# Patient Record
Sex: FEMALE | Race: WHITE | URBAN - METROPOLITAN AREA
[De-identification: names, ages, dates, MRNs, and addresses within clinical notes are randomized per-mention and may not be internally consistent; named-entity substitution may affect disease eponyms.]

---

## 2020-02-24 ENCOUNTER — OFFICE VISIT (OUTPATIENT)
Dept: INTERNAL MEDICINE CLINIC | Age: 40
End: 2020-02-24
Payer: MEDICARE

## 2020-02-24 VITALS
WEIGHT: 133.8 LBS | BODY MASS INDEX: 26.27 KG/M2 | SYSTOLIC BLOOD PRESSURE: 92 MMHG | HEART RATE: 66 BPM | RESPIRATION RATE: 18 BRPM | OXYGEN SATURATION: 99 % | DIASTOLIC BLOOD PRESSURE: 56 MMHG | HEIGHT: 60 IN

## 2020-02-24 PROCEDURE — G8427 DOCREV CUR MEDS BY ELIG CLIN: HCPCS | Performed by: NURSE PRACTITIONER

## 2020-02-24 PROCEDURE — 99202 OFFICE O/P NEW SF 15 MIN: CPT | Performed by: NURSE PRACTITIONER

## 2020-02-24 PROCEDURE — 96160 PT-FOCUSED HLTH RISK ASSMT: CPT | Performed by: NURSE PRACTITIONER

## 2020-02-24 PROCEDURE — G8419 CALC BMI OUT NRM PARAM NOF/U: HCPCS | Performed by: NURSE PRACTITIONER

## 2020-02-24 PROCEDURE — 1036F TOBACCO NON-USER: CPT | Performed by: NURSE PRACTITIONER

## 2020-02-24 PROCEDURE — G8484 FLU IMMUNIZE NO ADMIN: HCPCS | Performed by: NURSE PRACTITIONER

## 2020-02-24 RX ORDER — NALOXONE HYDROCHLORIDE 4 MG/.1ML
SPRAY NASAL
COMMUNITY
Start: 2019-12-18

## 2020-02-24 RX ORDER — HYDROXYZINE PAMOATE 50 MG/1
50 CAPSULE ORAL 3 TIMES DAILY PRN
COMMUNITY

## 2020-02-24 RX ORDER — BUPRENORPHINE HYDROCHLORIDE AND NALOXONE HYDROCHLORIDE DIHYDRATE 8; 2 MG/1; MG/1
3 TABLET SUBLINGUAL DAILY
COMMUNITY

## 2020-02-24 RX ORDER — PAROXETINE HYDROCHLORIDE 20 MG/1
20 TABLET, FILM COATED ORAL DAILY
Qty: 30 TABLET | Refills: 3 | Status: SHIPPED | OUTPATIENT
Start: 2020-02-24 | End: 2020-08-18 | Stop reason: SDUPTHER

## 2020-02-24 SDOH — ECONOMIC STABILITY: TRANSPORTATION INSECURITY
IN THE PAST 12 MONTHS, HAS THE LACK OF TRANSPORTATION KEPT YOU FROM MEDICAL APPOINTMENTS OR FROM GETTING MEDICATIONS?: YES

## 2020-02-24 SDOH — SOCIAL STABILITY: SOCIAL INSECURITY
WITHIN THE LAST YEAR, HAVE TO BEEN RAPED OR FORCED TO HAVE ANY KIND OF SEXUAL ACTIVITY BY YOUR PARTNER OR EX-PARTNER?: NO

## 2020-02-24 SDOH — SOCIAL STABILITY: SOCIAL INSECURITY: WITHIN THE LAST YEAR, HAVE YOU BEEN HUMILIATED OR EMOTIONALLY ABUSED IN OTHER WAYS BY YOUR PARTNER OR EX-PARTNER?: NO

## 2020-02-24 SDOH — ECONOMIC STABILITY: FOOD INSECURITY: WITHIN THE PAST 12 MONTHS, YOU WORRIED THAT YOUR FOOD WOULD RUN OUT BEFORE YOU GOT MONEY TO BUY MORE.: OFTEN TRUE

## 2020-02-24 SDOH — SOCIAL STABILITY: SOCIAL INSECURITY: WITHIN THE LAST YEAR, HAVE YOU BEEN AFRAID OF YOUR PARTNER OR EX-PARTNER?: YES

## 2020-02-24 SDOH — SOCIAL STABILITY: SOCIAL INSECURITY
WITHIN THE LAST YEAR, HAVE YOU BEEN KICKED, HIT, SLAPPED, OR OTHERWISE PHYSICALLY HURT BY YOUR PARTNER OR EX-PARTNER?: NO

## 2020-02-24 SDOH — ECONOMIC STABILITY: TRANSPORTATION INSECURITY
IN THE PAST 12 MONTHS, HAS LACK OF TRANSPORTATION KEPT YOU FROM MEETINGS, WORK, OR FROM GETTING THINGS NEEDED FOR DAILY LIVING?: YES

## 2020-02-24 SDOH — ECONOMIC STABILITY: FOOD INSECURITY: WITHIN THE PAST 12 MONTHS, THE FOOD YOU BOUGHT JUST DIDN'T LAST AND YOU DIDN'T HAVE MONEY TO GET MORE.: OFTEN TRUE

## 2020-02-24 SDOH — ECONOMIC STABILITY: INCOME INSECURITY: HOW HARD IS IT FOR YOU TO PAY FOR THE VERY BASICS LIKE FOOD, HOUSING, MEDICAL CARE, AND HEATING?: VERY HARD

## 2020-02-24 ASSESSMENT — PATIENT HEALTH QUESTIONNAIRE - PHQ9
7. TROUBLE CONCENTRATING ON THINGS, SUCH AS READING THE NEWSPAPER OR WATCHING TELEVISION: 3
SUM OF ALL RESPONSES TO PHQ QUESTIONS 1-9: 19
9. THOUGHTS THAT YOU WOULD BE BETTER OFF DEAD, OR OF HURTING YOURSELF: 0
8. MOVING OR SPEAKING SO SLOWLY THAT OTHER PEOPLE COULD HAVE NOTICED. OR THE OPPOSITE, BEING SO FIGETY OR RESTLESS THAT YOU HAVE BEEN MOVING AROUND A LOT MORE THAN USUAL: 3
SUM OF ALL RESPONSES TO PHQ9 QUESTIONS 1 & 2: 5
4. FEELING TIRED OR HAVING LITTLE ENERGY: 2
1. LITTLE INTEREST OR PLEASURE IN DOING THINGS: 2
10. IF YOU CHECKED OFF ANY PROBLEMS, HOW DIFFICULT HAVE THESE PROBLEMS MADE IT FOR YOU TO DO YOUR WORK, TAKE CARE OF THINGS AT HOME, OR GET ALONG WITH OTHER PEOPLE: 2
SUM OF ALL RESPONSES TO PHQ QUESTIONS 1-9: 19
3. TROUBLE FALLING OR STAYING ASLEEP: 3
2. FEELING DOWN, DEPRESSED OR HOPELESS: 3
6. FEELING BAD ABOUT YOURSELF - OR THAT YOU ARE A FAILURE OR HAVE LET YOURSELF OR YOUR FAMILY DOWN: 3
5. POOR APPETITE OR OVEREATING: 0

## 2020-02-24 ASSESSMENT — ENCOUNTER SYMPTOMS
SHORTNESS OF BREATH: 0
ABDOMINAL PAIN: 0
DIARRHEA: 0
CHEST TIGHTNESS: 0
CONSTIPATION: 0
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
## 2020-02-24 NOTE — LETTER
[address] [place] [address]  Phone: [phone]  Fax: [phone] Taylor, APRN - CNP                                                                                   February 24, 2020                       Valeriano Rodriguez  [address]   [address] [facility] [place] [address]      Dear Maile Paez: Thank you for enrolling in [address] Please follow the instructions below to securely access your online medical record. [email or web address] allows you to send messages to your doctor, view your test results, renew your prescriptions, schedule appointments, and more. How Do I Sign Up? 1. In your Internet browser, go to [email or web address]  2. Click on the Sign Up Now link in the Sign In box. You will see the New Member Sign Up page. 3. Enter your [email or web address] Access Code exactly as it appears below. You will not need to use this code after youve completed the sign-up process. If you do not sign up before the expiration date, you must request a new code. [email or web address] Access Code: [email or web address]  Expires: 4/9/2020 10:54 AM    4. Enter your Social Security Number ([id number]) and Date of Birth (mm/dd/yyyy) as indicated and click Submit. You will be taken to the next sign-up page. 5. Create a [email or web address] ID. This will be your [email or web address] login ID and cannot be changed, so think of one that is secure and easy to remember. 6. Create a [email or web address] password. You can change your password at any time. 7. Enter your Password Reset Question and Answer. This can be used at a later time if you forget your password. 8. Enter your e-mail address. You will receive e-mail notification when new information is available in [address] 9. Click Sign Up. You can now view your medical record. Additional Information  If you have questions, please contact the physician practice where you receive care. Remember, [email or web address] is NOT to be used for urgent needs. For medical emergencies, dial 911. For questions regarding your [employer] account call [phone]. If you have a clinical question, please call your doctor's office.     Sincerely,  ROMAN Martinez CNP

## 2020-02-24 NOTE — PATIENT INSTRUCTIONS
Start taking Paxil daily  Patient Education        paroxetine  Pronunciation:  nick SPANGLER a teen  Brand:  Brisdelle, Paxil, Paxil CR, Monica Wood  What is the most important information I should know about paroxetine? You should not use paroxetine if you are also taking pimozide or thioridazine. Do not use paroxetine within 14 days before or 14 days after you have used an MAO inhibitor, such as isocarboxazid, linezolid, methylene blue injection, phenelzine, rasagiline, selegiline, or tranylcypromine. Some young people have thoughts about suicide when first taking an antidepressant. Stay alert to changes in your mood or symptoms. Report any new or worsening symptoms to your doctor  Seek medical attention right away if you have symptoms such as: agitation, hallucinations, muscle stiffness, twitching, loss of coordination, dizziness, warmth or tingly feeling, nausea, vomiting, diarrhea, fever, sweating, tremors, racing heartbeats, or a seizure (convulsions). What is paroxetine? Paroxetine is an antidepressant in a group of drugs called selective serotonin reuptake inhibitors (SSRIs). Paroxetine affects chemicals in the brain that may be unbalanced in people with depression, anxiety, or other disorders. Paroxetine is used to treat depression, obsessive-compulsive disorder, anxiety disorders, post-traumatic stress disorder (PTSD), and premenstrual dysphoric disorder (PMDD). The Brisdelle brand of paroxetine is used to treat hot flashes related to menopause. Shama Lev is not for treating any other conditions. Paroxetine may also be used for purposes not listed in this medication guide. What should I discuss with my healthcare provider before taking paroxetine? You should not use this medicine if you are allergic to paroxetine, or if you are also taking pimozide or thioridazine. Do not use an MAO inhibitor within 14 days before or 14 days after you take paroxetine. A dangerous drug interaction could occur.  MAO inhibitors include isocarboxazid, linezolid, phenelzine, rasagiline, selegiline, and tranylcypromine. After you stop taking paroxetine you must wait at least 14 days before you start taking an MAO inhibitor. To make sure paroxetine is safe for you, tell your doctor if you have:  · heart disease, high blood pressure, history of stroke;  · liver or kidney disease;  · a bleeding or blood clotting disorder;  · seizures or epilepsy;  · bipolar disorder (manic depression), or a history of drug abuse or suicidal thoughts;  · narrow-angle glaucoma; or  · low levels of sodium in your blood. Some young people have thoughts about suicide when first taking an antidepressant. Your doctor should check your progress at regular visits. Your family or other caregivers should also be alert to changes in your mood or symptoms. Taking an paroxetine during pregnancy may cause serious lung problems, a heart defect, or other complications in the baby. However, you may have a relapse of depression or other treated condition if you stop taking your antidepressant. Tell your doctor right away if you become pregnant. Do not start or stop taking this medicine during pregnancy without your doctor's advice. Do not use Brisdelle if you are pregnant. Paroxetine can pass into breast milk and may cause side effects in the nursing baby. You should not breast-feed while using this medicine. Paroxetine is not approved for use by anyone younger than 25years old. How should I take paroxetine? Follow all directions on your prescription label. Your doctor may occasionally change your dose. Do not take this medicine in larger or smaller amounts or for longer than recommended. Do not crush, chew, or break an extended-release tablet. Swallow it whole. Shake the oral suspension (liquid) well just before you measure a dose. Measure liquid medicine with the dosing syringe provided, or with a special dose-measuring spoon or medicine cup.  If you do not have a dose-measuring device, ask your pharmacist for one. It may take up to 4 weeks before your symptoms improve. Keep using the medication as directed and tell your doctor if your symptoms do not improve. Do not stop using paroxetine suddenly, or you could have unpleasant withdrawal symptoms. Ask your doctor how to safely stop using paroxetine. Follow your doctor's instructions about tapering your dose. Store at room temperature away from moisture, heat, and light. What happens if I miss a dose? Take the missed dose as soon as you remember. Skip the missed dose if it is almost time for your next scheduled dose. Do not take extra medicine to make up the missed dose. What happens if I overdose? Seek emergency medical attention or call the Poison Help line at 1-723.552.8904. An overdose of paroxetine can be fatal.  What should I avoid while taking paroxetine? Drinking alcohol with this medicine can cause side effects. Ask your doctor before taking a nonsteroidal anti-inflammatory drug (NSAID) for pain, arthritis, fever, or swelling. This includes aspirin, ibuprofen (Advil, Motrin), naproxen (Aleve), celecoxib (Celebrex), diclofenac, indomethacin, meloxicam, and others. Using an NSAID with paroxetine may cause you to bruise or bleed easily. Paroxetine may impair your thinking or reactions. Be careful if you drive or do anything that requires you to be alert. What are the possible side effects of paroxetine? Get emergency medical help if you have signs of an allergic reaction: skin rash or hives; difficult breathing; swelling of your face, lips, tongue, or throat. Report any new or worsening symptoms to your doctor, such as: mood or behavior changes, anxiety, panic attacks, trouble sleeping, or if you feel impulsive, irritable, agitated, hostile, aggressive, restless, hyperactive (mentally or physically), more depressed, or have thoughts about suicide or hurting yourself.   Call your doctor at once if you have:  · racing thoughts, decreased need for sleep, unusual risk-taking behavior, feelings of extreme happiness or sadness, being more talkative than usual;  · blurred vision, tunnel vision, eye pain or swelling, or seeing halos around lights;  · unusual bone pain or tenderness, swelling or bruising;  · changes in weight or appetite;  · easy bruising, unusual bleeding (nose, mouth, vagina, or rectum), coughing up blood;  · high levels of serotonin in the body --agitation, hallucinations, fever, fast heart rate, overactive reflexes, nausea, vomiting, diarrhea, loss of coordination, fainting;  · low levels of sodium in the body --headache, confusion, slurred speech, severe weakness, loss of coordination, feeling unsteady;  · severe nervous system reaction --very stiff (rigid) muscles, high fever, sweating, confusion, fast or uneven heartbeats, tremors, fainting; or  · severe skin reaction --fever, sore throat, swelling in your face or tongue, burning in your eyes, skin pain, followed by a red or purple skin rash that spreads (especially in the face or upper body) and causes blistering and peeling. Common side effects may include:  · vision changes;  · weakness, drowsiness, dizziness;  · sweating, anxiety, shaking;  · sleep problems (insomnia);  · loss of appetite, constipation;  · dry mouth, yawning; or  · decreased sex drive, impotence, or difficulty having an orgasm. This is not a complete list of side effects and others may occur. Call your doctor for medical advice about side effects. You may report side effects to FDA at 1-060-FDA-2794. What other drugs will affect paroxetine? Taking paroxetine with other drugs that make you sleepy can worsen this effect. Ask your doctor before taking a sleeping pill, narcotic medication, muscle relaxer, or medicine for anxiety, depression, or seizures.   Tell your doctor about all your current medicines and any you start or stop using, especially:  · cimetidine (Tagamet), St. Cain's wort, tamoxifen, tryptophan (sometimes called L-tryptophan), warfarin (Coumadin, Jantoven);  · heart rhythm medicine;  · HIV or AIDS medications;  · certain medicines to treat narcolepsy or ADHD --amphetamine, atomoxetine, dextroamphetamine, Adderall, Dexedrine, Evekeo, Vyvanse, and others;  · narcotic pain medicine --fentanyl, tramadol;  · medicine to treat anxiety, mood disorders, thought disorders, or mental illness --such as buspirone, lithium, other antidepressants, or antipsychotics;  · migraine headache medicine --sumatriptan, rizatriptan, zolmitriptan, and others; or  · seizure medicine --phenobarbital, phenytoin. This list is not complete. Other drugs may interact with paroxetine, including prescription and over-the-counter medicines, vitamins, and herbal products. Not all possible interactions are listed in this medication guide. Where can I get more information? Your pharmacist can provide more information about paroxetine. Remember, keep this and all other medicines out of the reach of children, never share your medicines with others, and use this medication only for the indication prescribed. Every effort has been made to ensure that the information provided by [facility] is accurate, up-to-date, and complete, but no guarantee is made to that effect. Drug information contained herein may be time sensitive. [facility] information has been compiled for use by healthcare practitioners and consumers in the [facility] and therefore [facility] does not warrant that uses outside of the [facility] are appropriate, unless specifically indicated otherwise. [facility]'s drug information does not endorse drugs, diagnose patients or recommend therapy.  [facility] drug information is an informational resource designed to assist licensed healthcare practitioners in caring for their patients and/or to serve consumers viewing this service as a supplement to, and not a substitute for, the expertise, skill, knowledge and judgment of healthcare practitioners. The absence of a warning for a given drug or drug combination in no way should be construed to indicate that the drug or drug combination is safe, effective or appropriate for any given patient. [facility] does not assume any responsibility for any aspect of healthcare administered with the aid of information [facility] provides. The information contained herein is not intended to cover all possible uses, directions, precautions, warnings, drug interactions, allergic reactions, or adverse effects. If you have questions about the drugs you are taking, check with your doctor, nurse or pharmacist.  Copyright 6170-0750 [address]: 26.01. Revision date: 5/8/2017. Care instructions adapted under license by [facility] ([facility]). If you have questions about a medical condition or this instruction, always ask your healthcare professional. [place] [address] any warranty or liability for your use of this information.      Increase fluid intake  Make appointment with [facility] and Psychologist

## 2020-03-06 PROBLEM — G43.909 MIGRAINES: Status: ACTIVE | Noted: 2020-03-06

## 2020-03-06 PROBLEM — F11.10 OPIATE ABUSE, CONTINUOUS (HCC): Status: ACTIVE | Noted: 2020-03-06

## 2020-03-19 ENCOUNTER — OFFICE VISIT (OUTPATIENT)
Dept: PSYCHOLOGY | Age: 40
End: 2020-03-19
Payer: MEDICARE

## 2020-03-19 ENCOUNTER — TELEPHONE (OUTPATIENT)
Dept: INTERNAL MEDICINE CLINIC | Age: 40
End: 2020-03-19

## 2020-03-19 ENCOUNTER — OFFICE VISIT (OUTPATIENT)
Dept: INTERNAL MEDICINE CLINIC | Age: 40
End: 2020-03-19
Payer: MEDICARE

## 2020-03-19 VITALS
TEMPERATURE: 98.2 F | HEART RATE: 72 BPM | WEIGHT: 136.6 LBS | BODY MASS INDEX: 27.54 KG/M2 | SYSTOLIC BLOOD PRESSURE: 103 MMHG | HEIGHT: 59 IN | DIASTOLIC BLOOD PRESSURE: 65 MMHG | RESPIRATION RATE: 14 BRPM | OXYGEN SATURATION: 97 %

## 2020-03-19 PROBLEM — F43.22 ANXIOUS MOOD AS ADJUSTMENT REACTION: Status: ACTIVE | Noted: 2020-03-19

## 2020-03-19 PROBLEM — F34.1 PERSISTENT DEPRESSIVE DISORDER WITH ANXIOUS DISTRESS, CURRENTLY MODERATE: Status: ACTIVE | Noted: 2020-03-19

## 2020-03-19 PROCEDURE — G8419 CALC BMI OUT NRM PARAM NOF/U: HCPCS | Performed by: NURSE PRACTITIONER

## 2020-03-19 PROCEDURE — G8484 FLU IMMUNIZE NO ADMIN: HCPCS | Performed by: NURSE PRACTITIONER

## 2020-03-19 PROCEDURE — G8427 DOCREV CUR MEDS BY ELIG CLIN: HCPCS | Performed by: NURSE PRACTITIONER

## 2020-03-19 PROCEDURE — 1036F TOBACCO NON-USER: CPT | Performed by: NURSE PRACTITIONER

## 2020-03-19 PROCEDURE — 96160 PT-FOCUSED HLTH RISK ASSMT: CPT | Performed by: NURSE PRACTITIONER

## 2020-03-19 PROCEDURE — 90791 PSYCH DIAGNOSTIC EVALUATION: CPT | Performed by: PSYCHOLOGIST

## 2020-03-19 PROCEDURE — G8431 POS CLIN DEPRES SCRN F/U DOC: HCPCS | Performed by: NURSE PRACTITIONER

## 2020-03-19 PROCEDURE — 99213 OFFICE O/P EST LOW 20 MIN: CPT | Performed by: NURSE PRACTITIONER

## 2020-03-19 RX ORDER — ONDANSETRON 4 MG/1
4 TABLET, FILM COATED ORAL 3 TIMES DAILY PRN
Qty: 30 TABLET | Refills: 0 | Status: SHIPPED | OUTPATIENT
Start: 2020-03-19

## 2020-03-19 ASSESSMENT — PATIENT HEALTH QUESTIONNAIRE - PHQ9
SUM OF ALL RESPONSES TO PHQ9 QUESTIONS 1 & 2: 4
6. FEELING BAD ABOUT YOURSELF - OR THAT YOU ARE A FAILURE OR HAVE LET YOURSELF OR YOUR FAMILY DOWN: 2
6. FEELING BAD ABOUT YOURSELF - OR THAT YOU ARE A FAILURE OR HAVE LET YOURSELF OR YOUR FAMILY DOWN: 2
8. MOVING OR SPEAKING SO SLOWLY THAT OTHER PEOPLE COULD HAVE NOTICED. OR THE OPPOSITE, BEING SO FIGETY OR RESTLESS THAT YOU HAVE BEEN MOVING AROUND A LOT MORE THAN USUAL: 1
9. THOUGHTS THAT YOU WOULD BE BETTER OFF DEAD, OR OF HURTING YOURSELF: 0
4. FEELING TIRED OR HAVING LITTLE ENERGY: 2
3. TROUBLE FALLING OR STAYING ASLEEP: 1
3. TROUBLE FALLING OR STAYING ASLEEP: 1
8. MOVING OR SPEAKING SO SLOWLY THAT OTHER PEOPLE COULD HAVE NOTICED. OR THE OPPOSITE, BEING SO FIGETY OR RESTLESS THAT YOU HAVE BEEN MOVING AROUND A LOT MORE THAN USUAL: 2
2. FEELING DOWN, DEPRESSED OR HOPELESS: 2
SUM OF ALL RESPONSES TO PHQ QUESTIONS 1-9: 12
9. THOUGHTS THAT YOU WOULD BE BETTER OFF DEAD, OR OF HURTING YOURSELF: 0
SUM OF ALL RESPONSES TO PHQ QUESTIONS 1-9: 12
SUM OF ALL RESPONSES TO PHQ9 QUESTIONS 1 & 2: 4
2. FEELING DOWN, DEPRESSED OR HOPELESS: 2
7. TROUBLE CONCENTRATING ON THINGS, SUCH AS READING THE NEWSPAPER OR WATCHING TELEVISION: 1
SUM OF ALL RESPONSES TO PHQ QUESTIONS 1-9: 11
10. IF YOU CHECKED OFF ANY PROBLEMS, HOW DIFFICULT HAVE THESE PROBLEMS MADE IT FOR YOU TO DO YOUR WORK, TAKE CARE OF THINGS AT HOME, OR GET ALONG WITH OTHER PEOPLE: 1
5. POOR APPETITE OR OVEREATING: 0
5. POOR APPETITE OR OVEREATING: 0
1. LITTLE INTEREST OR PLEASURE IN DOING THINGS: 2
SUM OF ALL RESPONSES TO PHQ QUESTIONS 1-9: 11
7. TROUBLE CONCENTRATING ON THINGS, SUCH AS READING THE NEWSPAPER OR WATCHING TELEVISION: 1
1. LITTLE INTEREST OR PLEASURE IN DOING THINGS: 2
4. FEELING TIRED OR HAVING LITTLE ENERGY: 2

## 2020-03-19 ASSESSMENT — ANXIETY QUESTIONNAIRES
3. WORRYING TOO MUCH ABOUT DIFFERENT THINGS: 1-SEVERAL DAYS
5. BEING SO RESTLESS THAT IT IS HARD TO SIT STILL: 1-SEVERAL DAYS
4. TROUBLE RELAXING: 2-OVER HALF THE DAYS
6. BECOMING EASILY ANNOYED OR IRRITABLE: 0-NOT AT ALL
GAD7 TOTAL SCORE: 6
2. NOT BEING ABLE TO STOP OR CONTROL WORRYING: 1-SEVERAL DAYS
1. FEELING NERVOUS, ANXIOUS, OR ON EDGE: 1-SEVERAL DAYS
7. FEELING AFRAID AS IF SOMETHING AWFUL MIGHT HAPPEN: 0-NOT AT ALL

## 2020-03-19 ASSESSMENT — ENCOUNTER SYMPTOMS: CHEST TIGHTNESS: 0

## 2020-03-19 NOTE — PATIENT INSTRUCTIONS
The [address] [facility]) Consultant giving you this message provides team-based care to treat the mind and body. He works directly with your doctor, who will always stay in charge of your care. Most [facility] visits are 30 minutes or less. Usually, the [facility] provider and your doctor continue to see you until you are starting to do better and have a good plan in place for continued progress. Once that happens, most patients follow-up with just their doctor (though the [facility] provider remains available to you as needed). If you are not improving, or if you desire outside mental health treatment, or if your doctor recommends more specialized help, we will be happy to help you find a mental health specialist in the community. Please also note your health insurance may be billed for [facility] visits. Check with your insurance company, and tell the [facility] provider, if you have any questions about your [facility] coverage. Diaphragmatic Breathing Exercises    Exercise 1:  The Stimulating Breath (also called the Caren Breath)  This exercise aims to raise energy level and increase alertness. - Inhale and exhale rapidly through your nose, keeping your mouth closed but relaxed. Your breaths in and out should be equal in duration, but as short as possible. This is a noisy breathing exercise. - Try for three in-and-out breath cycles per second. This produces a quick movement of the diaphragm, suggesting a caren. Breathe normally after each cycle. - Do not do for more than 15 seconds on your first try. Each time you practice the Stimulating Breath, you can increase your time by five seconds or so, until you reach a full minute. If done properly, you may feel invigorated, comparable to the heightened awareness you feel after a good workout. You should feel the effort at the back of the neck, the diaphragm, the chest and the abdomen.  Try this breathing exercise the next time you need an energy boost and feel yourself reaching for a cup of coffee. Exercise 2:  The 4-7-8 (or Relaxing Breath) Exercise  This exercise is utterly simple, takes almost no time, requires no equipment and can be done anywhere. Although you can do the exercise in any position, sit with your back straight while learning the exercise. Place the tip of your tongue against the ridge of tissue just behind your upper front teeth, and keep it there through the entire exercise. You will be exhaling through your mouth around your tongue; try pursing your lips slightly if this seems awkward.  Exhale completely through your mouth, making a whoosh sound.  Close your mouth and inhale quietly through your nose to a mental count of four.  Hold your breath for a count of seven.  Exhale completely through your mouth, making a whoosh sound to a count of eight.  This is one breath. Now inhale again and repeat the cycle three more times for a total of four breaths. Note that you always inhale quietly through your nose and exhale audibly through your mouth. The tip of your tongue stays in position the whole time. Exhalation takes twice as long as inhalation. The absolute time you spend on each phase is not important; the ratio of 4:7:8 is important. If you have trouble holding your breath, speed the exercise up but keep to the ratio of 4:7:8 for the three phases. With practice you can slow it all down and get used to inhaling and exhaling more and more deeply. This exercise is a natural tranquilizer for the nervous system. Unlike tranquilizing drugs, which are often effective when you first take them but then lose their power over time, this exercise is subtle when you first try it but gains in power with repetition and practice. Do it at least twice a day. You cannot do it too frequently. Do NOT do more than 4 breaths at one time for the first month of practice. Later, if you wish, you can extend it to eight breaths.  If you feel a little lightheaded when you first breathe

## 2020-03-19 NOTE — PROGRESS NOTES
[address] [facility] [address]  [address] [facility] [place] [address]  Dept: [phone]       3/19/2020     Marisa Rodriguez (:  1980)is a 42 y.o. female, here for evaluation of the following medical concerns: This is an established patient reporting her episodes of anxiety and depression has improved. HPI  Substance Use Disorder  She reports a history of substance use disorder. Was started on suboxone last May, was not adherent. She reports an overdose in December, was not hospitalized, re-established with [facility]. Reports starting Suboxone daily, groups twice weekly, counseling weekly, case management as needed. States she is 96 days substance free. Group sessions have been canceled. Presently on 4 week dosing, states she feels comfortable with her brother administering it to her daily as appose to her administering it herself.      Anxiety  She reports a history of anxiety which has worsen sine not using. She reports taking Vistaril, with some effectiveness, but requesting additional assistance. Symptoms: fidgety, agitated, palpatation. Reports taking the Vistaril as needed for panic attacks with good results.     Depression  Reports intermittent times of sadness and tearfulness. Denies suicidal/homicidal ideations. States she feels the Paxil is effective. Is presently in counseling and had an appointment with Dr. Richie Velasco immediately prior this appointment. Hepatitis C  Diagnosed in 2020 from [place]. Had labs drawn in January, will start treatment at [facility].  She states was approved for  Treatment and will receive an 8 week treatment. Will be monitored by GI throughout the treatment.     Lab Results   Component Value Date    WBC 7.4 2012    HGB 13.9 2012    HCT 40.8 2012    MCV 91.8 2012     2012     Lab Results   Component Value Date     10/19/2012    K 4.8 10/19/2012     10/19/2012    CO2 26 10/19/2012    BUN 8 10/19/2012    CREATININE 0.6 11/03/2012    GLUCOSE 94 10/19/2012    CALCIUM 9.4 10/19/2012    PROT 8.3 (H) 11/03/2012    LABALBU 5.2 (H) 11/03/2012    BILITOT 0.90 11/03/2012    ALKPHOS 72 11/03/2012    AST 25 11/03/2012    ALT 10 11/03/2012    GFRAA >60 11/03/2012       Review of Systems   Respiratory: Negative for chest tightness. Cardiovascular: Negative for chest pain. Neurological: Negative for dizziness and headaches. Psychiatric/Behavioral: The patient is nervous/anxious. Prior to Visit Medications    Medication Sig Taking? Authorizing Provider   ondansetron (ZOFRAN) 4 MG tablet Take 1 tablet by mouth 3 times daily as needed for Nausea or Vomiting Yes ROMAN Hogan CNP   buprenorphine-naloxone (SUBOXONE) 8-2 MG SUBL SL tablet Place 3 tablets under the tongue daily. Done @@ [facility] Yes Historical Provider, MD   hydrOXYzine (VISTARIL) 50 MG capsule Take 50 mg by mouth 3 times daily as needed Yes Historical Provider, MD   NARCAN 4 MG/0.1ML LIQD nasal spray USE 1 SPRAY IN ONE NOSTRIL UTD PRN Yes Historical Provider, MD   etonogestrel (Marshel Keto) 68 MG implant 68 mg by Subdermal route once Yes Historical Provider, MD   PARoxetine (PAXIL) 20 MG tablet Take 1 tablet by mouth daily Yes ROMAN Hogan CNP        Social History     Tobacco Use    Smoking status: Never Smoker    Smokeless tobacco: Never Used   Substance Use Topics    Alcohol use: No        Vitals:    03/19/20 1334   BP: 103/65   Site: Left Upper Arm   Position: Sitting   Cuff Size: Medium Adult   Pulse: 72   Resp: 14   Temp: 98.2 °F (36.8 °C)   TempSrc: Oral   SpO2: 97%   Weight: 136 lb 9.6 oz (62 kg)   Height: 4' 11\" (1.499 m)     Estimated body mass index is 27.59 kg/m² as calculated from the following:    Height as of this encounter: 4' 11\" (1.499 m). Weight as of this encounter: 136 lb 9.6 oz (62 kg). Physical Exam  Vitals signs reviewed.    Constitutional:       Appearance: Normal appearance. HENT:      Head: Normocephalic. Cardiovascular:      Rate and Rhythm: Normal rate. Pulmonary:      Effort: Pulmonary effort is normal.   Neurological:      Mental Status: She is alert. Psychiatric:         Attention and Perception: Attention normal.         Mood and Affect: Mood is anxious. Speech: Speech normal.         Behavior: Behavior normal.         ASSESSMENT/PLAN:  1. Positive depression screening  -Continue current medications. - Positive Screen for Clinical Depression with a Documented Follow-up Plan   -Continue therapy    2. Anxious mood as adjustment reaction  -Continue current medications.  -Continue follow up with [facility]    3. Persistent depressive disorder with anxious distress, currently moderate  -Continue follow up with Rustam Saunders in about 3 months (around 6/19/2020). --ROMAN Contreras - CNP on 3/19/2020 at 2:03 PM    On the basis of positive PHQ-9 screening (PHQ-9 Total Score: 12), the following plan was implemented: presently in treatment, has therapist, has appointment with Psychiatry. Patient will follow-up in 3 month(s) with PCP.

## 2020-05-13 ENCOUNTER — TELEPHONE (OUTPATIENT)
Dept: PSYCHIATRY | Age: 40
End: 2020-05-13

## 2020-08-18 RX ORDER — PAROXETINE HYDROCHLORIDE 20 MG/1
20 TABLET, FILM COATED ORAL DAILY
Qty: 30 TABLET | Refills: 3 | Status: SHIPPED | OUTPATIENT
Start: 2020-08-18 | End: 2021-01-20 | Stop reason: SDUPTHER

## 2021-01-19 DIAGNOSIS — F41.9 ANXIETY: ICD-10-CM

## 2021-01-20 RX ORDER — PAROXETINE HYDROCHLORIDE 20 MG/1
20 TABLET, FILM COATED ORAL DAILY
Qty: 30 TABLET | Refills: 3 | Status: SHIPPED | OUTPATIENT
Start: 2021-01-20